# Patient Record
Sex: MALE | Race: WHITE | NOT HISPANIC OR LATINO | Employment: UNEMPLOYED | URBAN - METROPOLITAN AREA
[De-identification: names, ages, dates, MRNs, and addresses within clinical notes are randomized per-mention and may not be internally consistent; named-entity substitution may affect disease eponyms.]

---

## 2018-02-05 ENCOUNTER — HISTORICAL (OUTPATIENT)
Dept: MEDSURG UNIT | Facility: HOSPITAL | Age: 28
End: 2018-02-05

## 2018-02-05 LAB
ABS NEUT (OLG): 7.19 X10(3)/MCL (ref 2.1–9.2)
BASOPHILS # BLD AUTO: 0.03 X10(3)/MCL
BASOPHILS NFR BLD AUTO: 0 % (ref 0–1)
BUN SERPL-MCNC: 11 MG/DL (ref 7–18)
CALCIUM SERPL-MCNC: 9.4 MG/DL (ref 8.5–10.1)
CHLORIDE SERPL-SCNC: 105 MMOL/L (ref 98–107)
CO2 SERPL-SCNC: 29 MMOL/L (ref 21–32)
CREAT SERPL-MCNC: 1 MG/DL (ref 0.6–1.3)
EOSINOPHIL # BLD AUTO: 0.07 X10(3)/MCL
EOSINOPHIL NFR BLD AUTO: 1 % (ref 0–5)
ERYTHROCYTE [DISTWIDTH] IN BLOOD BY AUTOMATED COUNT: 13.8 % (ref 11.5–14.5)
GLUCOSE SERPL-MCNC: 88 MG/DL (ref 74–106)
HCT VFR BLD AUTO: 45.6 % (ref 40–51)
HGB BLD-MCNC: 15.6 GM/DL (ref 13.5–17.5)
IMM GRANULOCYTES # BLD AUTO: 0.03 10*3/UL
IMM GRANULOCYTES NFR BLD AUTO: 0 %
LYMPHOCYTES # BLD AUTO: 2.29 X10(3)/MCL
LYMPHOCYTES NFR BLD AUTO: 22 % (ref 15–40)
MCH RBC QN AUTO: 31.3 PG (ref 26–34)
MCHC RBC AUTO-ENTMCNC: 34.2 GM/DL (ref 31–37)
MCV RBC AUTO: 91.4 FL (ref 80–100)
MONOCYTES # BLD AUTO: 0.74 X10(3)/MCL
MONOCYTES NFR BLD AUTO: 7 % (ref 4–12)
NEUTROPHILS # BLD AUTO: 7.19 X10(3)/MCL
NEUTROPHILS NFR BLD AUTO: 70 X10(3)/MCL
PLATELET # BLD AUTO: 208 X10(3)/MCL (ref 130–400)
PMV BLD AUTO: 11.5 FL (ref 7.4–10.4)
POTASSIUM SERPL-SCNC: 3.9 MMOL/L (ref 3.5–5.1)
RBC # BLD AUTO: 4.99 X10(6)/MCL (ref 4.5–5.9)
SODIUM SERPL-SCNC: 139 MMOL/L (ref 136–145)
WBC # SPEC AUTO: 10.4 X10(3)/MCL (ref 4.5–11)

## 2018-05-03 ENCOUNTER — HISTORICAL (OUTPATIENT)
Dept: ADMINISTRATIVE | Facility: HOSPITAL | Age: 28
End: 2018-05-03

## 2018-05-03 LAB
BUN SERPL-MCNC: 8 MG/DL (ref 7–18)
CALCIUM SERPL-MCNC: 9 MG/DL (ref 8.5–10.1)
CHLORIDE SERPL-SCNC: 107 MMOL/L (ref 98–107)
CO2 SERPL-SCNC: 29 MMOL/L (ref 21–32)
CREAT SERPL-MCNC: 1 MG/DL (ref 0.6–1.3)
CREAT/UREA NIT SERPL: 8
ERYTHROCYTE [DISTWIDTH] IN BLOOD BY AUTOMATED COUNT: 14.4 % (ref 11.5–14.5)
GLUCOSE SERPL-MCNC: 85 MG/DL (ref 74–106)
HCT VFR BLD AUTO: 48.4 % (ref 40–51)
HGB BLD-MCNC: 16.3 GM/DL (ref 13.5–17.5)
MCH RBC QN AUTO: 30.5 PG (ref 26–34)
MCHC RBC AUTO-ENTMCNC: 33.7 GM/DL (ref 31–37)
MCV RBC AUTO: 90.6 FL (ref 80–100)
PLATELET # BLD AUTO: 219 X10(3)/MCL (ref 130–400)
PMV BLD AUTO: 11.4 FL (ref 7.4–10.4)
POTASSIUM SERPL-SCNC: 4 MMOL/L (ref 3.5–5.1)
RBC # BLD AUTO: 5.34 X10(6)/MCL (ref 4.5–5.9)
SODIUM SERPL-SCNC: 139 MMOL/L (ref 136–145)
WBC # SPEC AUTO: 11.2 X10(3)/MCL (ref 4.5–11)

## 2018-05-07 ENCOUNTER — HISTORICAL (OUTPATIENT)
Dept: SURGERY | Facility: HOSPITAL | Age: 28
End: 2018-05-07

## 2022-04-09 ENCOUNTER — HISTORICAL (OUTPATIENT)
Dept: ADMINISTRATIVE | Facility: HOSPITAL | Age: 32
End: 2022-04-09

## 2022-04-29 VITALS — HEIGHT: 74 IN | WEIGHT: 198.44 LBS | BODY MASS INDEX: 25.47 KG/M2

## 2022-04-30 NOTE — OP NOTE
Patient:   Jus Briceño             MRN: [id number]            FIN: [id number]               Age:   27 years     Sex:  Male     :  1990   Associated Diagnoses:   None   Author:   Savannah Goldberg MD      Operative Report  Ophthalmology Service       Date performed: 18  Procedure: Secondary intraocular lens placement with synechiolysis , left eye  Attending: Aditya Rios MD  Primary Surgeon: Edd Goldberg MD  Assistant: None  Anesthesia: Local/MAC with topical tetracaine  Complications: none  Estimated blood loss: none  Implant: [id number] +21.5D       Indications: Patient had history of trauma followed by repair with lensectomy and left aphakic.  Patient wishes to have IOL placed. After a thorough discussion of the risks, benefits and alternatives to secondary IOL implantation, including, but not limited to, the rare risks of infection, retinal detachment, need for additional surgery, loss of vision and even loss of the eye, and loss of life, the patient voices good understanding and desires to proceed. Informed consent was obtained, signed, and witnessed and placed in the chart.       Description of procedure: Timeout performed with nurse, physician, and anesthesia personnel present and attentive. Consent was verified to be signed, dated, and timed, and the procedure was verified by using the patients name, date of birth, and medical record. The site was verified to have been marked above the appropriate eye, and all personnel agreed to proceed with surgery. The patients intraocular lens calculations and lens selection were reviewed and confirmed. After verification and marking of the proper eye in the preop holding area, several sets of 1% Mydriacil, 2.5% phenylephrine, and 1% Cyclogyl drops were then placed. The patient was brought to the operating room in supine position where the eye was prepped and draped in standard sterile fashion using 5% betadine and a lid speculum placed. Topical tetracaine was  used in addition to the preoperative anesthesia. A paracentesis incision was made at the 5:30 position.  Viscoat was injected into the anterior chamber.  An incision was made at 3:00 with the keratome blade.  Viscoat was used to lyse the posterior synechiae and the iris-conreal attachments.  A LI61A0 +21.5D lens was placed in the ciliary sulcus.  All remaining viscoelastic was removed from the eye.  A 10-0 nylon suture was placed in the wound. The wounds were found to be water tight at the end of the surgery.  Drops of vigamox and PredForte were placed onto the eye.  The patient tolerated the procedure well and was taken to the recovery area in stable condition. The patient was instructed to follow-up for routine post-operative care the following morning.

## 2022-04-30 NOTE — OP NOTE
* Final Report *  Document Contains Addenda  Op Note     Patient:   Jus Briceño             MRN: [id number]            FIN: [id number]               Age:   27 years     Sex:  Male     :  1990   Associated Diagnoses:   None   Author:   Berlin Garland MD      SURGEON: Berlin Garland MD    ATTENDING: Aditya Rios MD    PRE-OPERATIVE DIAGNOSIS: Open Globe, OS    POST-OPERATIVE DIAGNOSIS: Open Globe, OS; Traumatic cataract, OS    DATE OF SURGERY:18    PROCEDURES: Open globe repair, OS; Lensectomy, OS    ANESTHESIA: General    COMPLICATIONS: None    ESTIMATED BLOOD LOSS: None    DESCRIPTION OF PROCEDURE:  After verification and marking of the proper eye in the preop holding area, the patient was  brought to the operating room in supine position and put under general anesthesia. The patient was given 1 g IV ancef and a tetanus shot was deferred as pt reports having one in 2017. The eye was prepped and draped in standard sterile fashion using 5% betadine and a lid speculum placed. Viscoelastic was used to dissect iris from the laceration. Then 3 10-0 nylon sutures were used to close the corneal laceration. Then, two paracentesis incisions were created throughwhich 1% lidocaine was injected followed by vision blue and viscoelastic was used to fill the anterior chamber. At this point it was clear that the lens capsule had been violated by the traumatic injury. A 2.4mm keratome blade was used to  create a triplanar temporal clear corneal incision. A cystotome and Utrata forceps was then used  to fashion a continuous curvilinear capsulorrhexis. The lens was soft and was removed with a combination of phacoemulsification and aspiration. It was noted during removal of the lens material that vitreous was present in the anterior chamber in the area where the lens capsule was violated during the injury. Once all lens material was removed that was safely accessible viscoelastic was used to  push the vitreous posteriorly. The eye was found to be water tight. Vigamox, PredForte and Atropine eye gtts were placed in the eye. An eye patch and shield were then placed over the eye. The patient was instructed to follow-up tomorrow for post-operative care          Addendum by Aditya Rios MD on February 05, 2018 19:42 CST (Verified)  I was present for and scrubbed in for the entire case    Result type: Ophthalmology Office/Clinic Note  Result date: February 05, 2018 19:26 CST  Result status: Modified  Result title: Op Note  Performed by: Berlin Garland MD on February 05, 2018 19:37 CST  Verified by: Berlin Garland MD on February 05, 2018 19:37 CST  Encounter info: [id number], [facility], Day Surgery, 2/5/2018 - 2/5/2018    Doc has been moved by HIM specialist to the correct doc type.

## 2022-04-30 NOTE — H&P
* Final Report *   Document Contains Addenda  Chief Complaint Eye pain left eye History of Present Illness Pt present as a referral from Dr. Mittal for corneal laceration left eye. Pt states that earlier today he hit a staple with a hammer and it flew up and hit him in his left eye. He now complains of decreased vision, photophobia and eye pain OS Review of Systems Constitutional negative  Eye positive  ENMT negative  Respiratory negative  Cardiovascular negative  Gastrointestinal negative  Genitourinary negative  Hema/Lymph negative  Endocrine negative  Musculoskeletal negative  Neurologic negative Physical Exam [facility]:   OS 20/100    IOP: deferred    Pupils: deferred    EOM: intact OU    SLE OS: limited by pt cooperation due to pain  Ext: wnl OS  L/L: wnl OS  C/S: 1+ inj OS  K: laceration involving superonasal cornea with iris plugging wound.  AC: deep OS  I: iris to sound superonasally  L: clear but only brief view  V: no view  Assessment/Plan 1) Full-thickness corneal laceration left eye  --to OR today for repair  --discussed R/B/A of surgery with pt including risk of permanent vision loss and severe infection in the eye. Pt states understanding. Consent form signed.  --pt last at around 12:00 today, states he had a slice of pizza  --pt to go straight to hospital from clinic for pre-op    RTC tomorrow for POD#1    Discussed wtih Dr. Rios Problem List/Past Medical History Ongoing   No qualifying data Historical Medications No active medications Allergies clindamycin (.)    Addendum by Aditya Rios MD on February 05, 2018 16:41:55 CST (Verified)  patient with full-thickness corneal laceration with staple today  obvious entry wound with iris to wound  will get x-ray to ensure that there is no intraocular metallic FB  Result type: Office/Clinic Note-Physician   Result date: February 05, 2018 16:31 CST   Result status: Modified   Result title: Office Visit Note   Performed by: Dada VALDES, [place] SILVERIO on February  05, 2018 16:35 CST   Verified by: Berlin Garland MD on February 05, 2018 16:35 CST   Encounter info: [id number], [facility], Clinic Visit, 2/5/2018 - 2/5/2018   Doc has been moved by HIM specialist.